# Patient Record
Sex: FEMALE | Race: WHITE | NOT HISPANIC OR LATINO | URBAN - METROPOLITAN AREA
[De-identification: names, ages, dates, MRNs, and addresses within clinical notes are randomized per-mention and may not be internally consistent; named-entity substitution may affect disease eponyms.]

---

## 2017-02-16 ENCOUNTER — INPATIENT (INPATIENT)
Facility: HOSPITAL | Age: 77
LOS: 3 days | Discharge: HOME HEALTH | End: 2017-02-20
Attending: INTERNAL MEDICINE | Admitting: INTERNAL MEDICINE
Payer: MEDICARE

## 2017-02-16 VITALS
SYSTOLIC BLOOD PRESSURE: 152 MMHG | TEMPERATURE: 99 F | OXYGEN SATURATION: 99 % | RESPIRATION RATE: 20 BRPM | DIASTOLIC BLOOD PRESSURE: 108 MMHG | HEART RATE: 84 BPM

## 2017-02-16 LAB
ALBUMIN SERPL ELPH-MCNC: 4.4 G/DL — SIGNIFICANT CHANGE UP (ref 3.3–5)
ALP SERPL-CCNC: 97 U/L — SIGNIFICANT CHANGE UP (ref 40–120)
ALT FLD-CCNC: 23 U/L — SIGNIFICANT CHANGE UP (ref 4–33)
APPEARANCE UR: CLEAR — SIGNIFICANT CHANGE UP
AST SERPL-CCNC: 23 U/L — SIGNIFICANT CHANGE UP (ref 4–32)
BACTERIA # UR AUTO: SIGNIFICANT CHANGE UP
BASOPHILS # BLD AUTO: 0.02 K/UL — SIGNIFICANT CHANGE UP (ref 0–0.2)
BASOPHILS NFR BLD AUTO: 0.2 % — SIGNIFICANT CHANGE UP (ref 0–2)
BILIRUB SERPL-MCNC: 0.9 MG/DL — SIGNIFICANT CHANGE UP (ref 0.2–1.2)
BILIRUB UR-MCNC: NEGATIVE — SIGNIFICANT CHANGE UP
BLOOD UR QL VISUAL: NEGATIVE — SIGNIFICANT CHANGE UP
BUN SERPL-MCNC: 13 MG/DL — SIGNIFICANT CHANGE UP (ref 7–23)
CALCIUM SERPL-MCNC: 10 MG/DL — SIGNIFICANT CHANGE UP (ref 8.4–10.5)
CHLORIDE SERPL-SCNC: 101 MMOL/L — SIGNIFICANT CHANGE UP (ref 98–107)
CO2 SERPL-SCNC: 24 MMOL/L — SIGNIFICANT CHANGE UP (ref 22–31)
COLOR SPEC: SIGNIFICANT CHANGE UP
CREAT SERPL-MCNC: 0.91 MG/DL — SIGNIFICANT CHANGE UP (ref 0.5–1.3)
EOSINOPHIL # BLD AUTO: 0.1 K/UL — SIGNIFICANT CHANGE UP (ref 0–0.5)
EOSINOPHIL NFR BLD AUTO: 1.2 % — SIGNIFICANT CHANGE UP (ref 0–6)
GLUCOSE SERPL-MCNC: 108 MG/DL — HIGH (ref 70–99)
GLUCOSE UR-MCNC: NEGATIVE — SIGNIFICANT CHANGE UP
HCT VFR BLD CALC: 43.7 % — SIGNIFICANT CHANGE UP (ref 34.5–45)
HGB BLD-MCNC: 14.6 G/DL — SIGNIFICANT CHANGE UP (ref 11.5–15.5)
HYALINE CASTS # UR AUTO: SIGNIFICANT CHANGE UP (ref 0–?)
IMM GRANULOCYTES NFR BLD AUTO: 0.2 % — SIGNIFICANT CHANGE UP (ref 0–1.5)
KETONES UR-MCNC: SIGNIFICANT CHANGE UP
LEUKOCYTE ESTERASE UR-ACNC: HIGH
LYMPHOCYTES # BLD AUTO: 2.47 K/UL — SIGNIFICANT CHANGE UP (ref 1–3.3)
LYMPHOCYTES # BLD AUTO: 30.8 % — SIGNIFICANT CHANGE UP (ref 13–44)
MCHC RBC-ENTMCNC: 31.1 PG — SIGNIFICANT CHANGE UP (ref 27–34)
MCHC RBC-ENTMCNC: 33.4 % — SIGNIFICANT CHANGE UP (ref 32–36)
MCV RBC AUTO: 93 FL — SIGNIFICANT CHANGE UP (ref 80–100)
MONOCYTES # BLD AUTO: 0.65 K/UL — SIGNIFICANT CHANGE UP (ref 0–0.9)
MONOCYTES NFR BLD AUTO: 8.1 % — SIGNIFICANT CHANGE UP (ref 2–14)
MUCOUS THREADS # UR AUTO: SIGNIFICANT CHANGE UP
NEUTROPHILS # BLD AUTO: 4.76 K/UL — SIGNIFICANT CHANGE UP (ref 1.8–7.4)
NEUTROPHILS NFR BLD AUTO: 59.5 % — SIGNIFICANT CHANGE UP (ref 43–77)
NITRITE UR-MCNC: NEGATIVE — SIGNIFICANT CHANGE UP
NON-SQ EPI CELLS # UR AUTO: <1 — SIGNIFICANT CHANGE UP
PH UR: 6 — SIGNIFICANT CHANGE UP (ref 4.6–8)
PLATELET # BLD AUTO: 238 K/UL — SIGNIFICANT CHANGE UP (ref 150–400)
PMV BLD: 11.1 FL — SIGNIFICANT CHANGE UP (ref 7–13)
POTASSIUM SERPL-MCNC: 4.8 MMOL/L — SIGNIFICANT CHANGE UP (ref 3.5–5.3)
POTASSIUM SERPL-SCNC: 4.8 MMOL/L — SIGNIFICANT CHANGE UP (ref 3.5–5.3)
PROT SERPL-MCNC: 7.5 G/DL — SIGNIFICANT CHANGE UP (ref 6–8.3)
PROT UR-MCNC: NEGATIVE — SIGNIFICANT CHANGE UP
RBC # BLD: 4.7 M/UL — SIGNIFICANT CHANGE UP (ref 3.8–5.2)
RBC # FLD: 13.1 % — SIGNIFICANT CHANGE UP (ref 10.3–14.5)
RBC CASTS # UR COMP ASSIST: SIGNIFICANT CHANGE UP (ref 0–?)
SODIUM SERPL-SCNC: 142 MMOL/L — SIGNIFICANT CHANGE UP (ref 135–145)
SP GR SPEC: 1.02 — SIGNIFICANT CHANGE UP (ref 1–1.03)
SQUAMOUS # UR AUTO: SIGNIFICANT CHANGE UP
UROBILINOGEN FLD QL: NORMAL E.U. — SIGNIFICANT CHANGE UP (ref 0.1–0.2)
WBC # BLD: 8.02 K/UL — SIGNIFICANT CHANGE UP (ref 3.8–10.5)
WBC # FLD AUTO: 8.02 K/UL — SIGNIFICANT CHANGE UP (ref 3.8–10.5)
WBC UR QL: SIGNIFICANT CHANGE UP (ref 0–?)

## 2017-02-16 PROCEDURE — 71020: CPT | Mod: 26

## 2017-02-16 NOTE — ED PROVIDER NOTE - ATTENDING CONTRIBUTION TO CARE
Andrew: 76 yof with no previous similar episodes, today noted by  to be confused when he came home at 4pm, last seen normal last night or 7am today.  Pt keeps asking same questions and is very nervous and confused.  On exam, no distress, oriented to person and place, does not know president, date, holidays or what she ate or did today, otherwise neuro intact.  UA + with no sxs, states she wiped prior to sample.  Check labs, CT head and neuro eval, unsure if TGA or CVA.

## 2017-02-16 NOTE — ED PROVIDER NOTE - OBJECTIVE STATEMENT
76F h/o HTN, HLD presenting with acute memory loss. Last seen normal at 7am, when  arrived home at 4pm, noted that pt could not recall events from today or yesterday. At baseline, pt has no memory difficulties. Denies F, C, cough, CP/SOb, abd pain, weakness, numbness, headache, visual change.

## 2017-02-16 NOTE — ED ADULT TRIAGE NOTE - CHIEF COMPLAINT QUOTE
pt accompanied by family who state "pt has had change in mental status today she is having difficulty remembering things that were very recent"  FS 95

## 2017-02-17 DIAGNOSIS — I10 ESSENTIAL (PRIMARY) HYPERTENSION: ICD-10-CM

## 2017-02-17 DIAGNOSIS — I63.9 CEREBRAL INFARCTION, UNSPECIFIED: ICD-10-CM

## 2017-02-17 DIAGNOSIS — E78.5 HYPERLIPIDEMIA, UNSPECIFIED: ICD-10-CM

## 2017-02-17 DIAGNOSIS — Z29.9 ENCOUNTER FOR PROPHYLACTIC MEASURES, UNSPECIFIED: ICD-10-CM

## 2017-02-17 LAB
BUN SERPL-MCNC: 13 MG/DL — SIGNIFICANT CHANGE UP (ref 7–23)
CALCIUM SERPL-MCNC: 9.8 MG/DL — SIGNIFICANT CHANGE UP (ref 8.4–10.5)
CHLORIDE SERPL-SCNC: 102 MMOL/L — SIGNIFICANT CHANGE UP (ref 98–107)
CHOLEST SERPL-MCNC: 195 MG/DL — SIGNIFICANT CHANGE UP (ref 120–199)
CO2 SERPL-SCNC: 23 MMOL/L — SIGNIFICANT CHANGE UP (ref 22–31)
CREAT SERPL-MCNC: 0.76 MG/DL — SIGNIFICANT CHANGE UP (ref 0.5–1.3)
FOLATE SERPL-MCNC: > 20 NG/ML — HIGH (ref 4.7–20)
GLUCOSE SERPL-MCNC: 90 MG/DL — SIGNIFICANT CHANGE UP (ref 70–99)
HAV IGM SER-ACNC: NONREACTIVE — SIGNIFICANT CHANGE UP
HBA1C BLD-MCNC: 5.5 % — SIGNIFICANT CHANGE UP (ref 4–5.6)
HBV CORE IGM SER-ACNC: NONREACTIVE — SIGNIFICANT CHANGE UP
HBV SURFACE AG SER-ACNC: NONREACTIVE — SIGNIFICANT CHANGE UP
HCT VFR BLD CALC: 44 % — SIGNIFICANT CHANGE UP (ref 34.5–45)
HCV AB S/CO SERPL IA: 0.17 S/CO — SIGNIFICANT CHANGE UP
HCV AB SERPL-IMP: SIGNIFICANT CHANGE UP
HDLC SERPL-MCNC: 67 MG/DL — HIGH (ref 45–65)
HGB BLD-MCNC: 14.9 G/DL — SIGNIFICANT CHANGE UP (ref 11.5–15.5)
LIPID PNL WITH DIRECT LDL SERPL: 113 MG/DL — SIGNIFICANT CHANGE UP
MCHC RBC-ENTMCNC: 31.8 PG — SIGNIFICANT CHANGE UP (ref 27–34)
MCHC RBC-ENTMCNC: 33.9 % — SIGNIFICANT CHANGE UP (ref 32–36)
MCV RBC AUTO: 93.8 FL — SIGNIFICANT CHANGE UP (ref 80–100)
PLATELET # BLD AUTO: 202 K/UL — SIGNIFICANT CHANGE UP (ref 150–400)
PMV BLD: 11.9 FL — SIGNIFICANT CHANGE UP (ref 7–13)
POTASSIUM SERPL-MCNC: 4.7 MMOL/L — SIGNIFICANT CHANGE UP (ref 3.5–5.3)
POTASSIUM SERPL-SCNC: 4.7 MMOL/L — SIGNIFICANT CHANGE UP (ref 3.5–5.3)
RBC # BLD: 4.69 M/UL — SIGNIFICANT CHANGE UP (ref 3.8–5.2)
RBC # FLD: 13.2 % — SIGNIFICANT CHANGE UP (ref 10.3–14.5)
SODIUM SERPL-SCNC: 140 MMOL/L — SIGNIFICANT CHANGE UP (ref 135–145)
TRIGL SERPL-MCNC: 99 MG/DL — SIGNIFICANT CHANGE UP (ref 10–149)
TSH SERPL-MCNC: 3.89 UIU/ML — SIGNIFICANT CHANGE UP (ref 0.27–4.2)
VIT B12 SERPL-MCNC: 537 PG/ML — SIGNIFICANT CHANGE UP (ref 200–900)
WBC # BLD: 5.97 K/UL — SIGNIFICANT CHANGE UP (ref 3.8–10.5)
WBC # FLD AUTO: 5.97 K/UL — SIGNIFICANT CHANGE UP (ref 3.8–10.5)

## 2017-02-17 PROCEDURE — 99223 1ST HOSP IP/OBS HIGH 75: CPT

## 2017-02-17 PROCEDURE — 99222 1ST HOSP IP/OBS MODERATE 55: CPT | Mod: GC

## 2017-02-17 PROCEDURE — 70450 CT HEAD/BRAIN W/O DYE: CPT | Mod: 26

## 2017-02-17 RX ORDER — LATANOPROST 0.05 MG/ML
1 SOLUTION/ DROPS OPHTHALMIC; TOPICAL AT BEDTIME
Qty: 0 | Refills: 0 | Status: DISCONTINUED | OUTPATIENT
Start: 2017-02-17 | End: 2017-02-20

## 2017-02-17 RX ORDER — ACETAMINOPHEN 500 MG
650 TABLET ORAL EVERY 6 HOURS
Qty: 0 | Refills: 0 | Status: COMPLETED | OUTPATIENT
Start: 2017-02-17 | End: 2017-02-17

## 2017-02-17 RX ORDER — BIMATOPROST 0.3 MG/ML
1 SOLUTION/ DROPS OPHTHALMIC
Qty: 0 | Refills: 0 | COMMUNITY

## 2017-02-17 RX ORDER — ATORVASTATIN CALCIUM 80 MG/1
40 TABLET, FILM COATED ORAL AT BEDTIME
Qty: 0 | Refills: 0 | Status: DISCONTINUED | OUTPATIENT
Start: 2017-02-17 | End: 2017-02-20

## 2017-02-17 RX ORDER — CEFTRIAXONE 500 MG/1
1 INJECTION, POWDER, FOR SOLUTION INTRAMUSCULAR; INTRAVENOUS ONCE
Qty: 0 | Refills: 0 | Status: COMPLETED | OUTPATIENT
Start: 2017-02-17 | End: 2017-02-17

## 2017-02-17 RX ORDER — CEPHALEXIN 500 MG
500 CAPSULE ORAL ONCE
Qty: 0 | Refills: 0 | Status: COMPLETED | OUTPATIENT
Start: 2017-02-17 | End: 2017-02-17

## 2017-02-17 RX ORDER — CEFTRIAXONE 500 MG/1
INJECTION, POWDER, FOR SOLUTION INTRAMUSCULAR; INTRAVENOUS
Qty: 0 | Refills: 0 | Status: DISCONTINUED | OUTPATIENT
Start: 2017-02-17 | End: 2017-02-18

## 2017-02-17 RX ORDER — LANOLIN ALCOHOL/MO/W.PET/CERES
3 CREAM (GRAM) TOPICAL AT BEDTIME
Qty: 0 | Refills: 0 | Status: DISCONTINUED | OUTPATIENT
Start: 2017-02-17 | End: 2017-02-20

## 2017-02-17 RX ORDER — LOSARTAN POTASSIUM 100 MG/1
25 TABLET, FILM COATED ORAL DAILY
Qty: 0 | Refills: 0 | Status: DISCONTINUED | OUTPATIENT
Start: 2017-02-17 | End: 2017-02-20

## 2017-02-17 RX ORDER — ASPIRIN/CALCIUM CARB/MAGNESIUM 324 MG
81 TABLET ORAL DAILY
Qty: 0 | Refills: 0 | Status: DISCONTINUED | OUTPATIENT
Start: 2017-02-17 | End: 2017-02-20

## 2017-02-17 RX ORDER — CEFTRIAXONE 500 MG/1
1 INJECTION, POWDER, FOR SOLUTION INTRAMUSCULAR; INTRAVENOUS EVERY 24 HOURS
Qty: 0 | Refills: 0 | Status: DISCONTINUED | OUTPATIENT
Start: 2017-02-18 | End: 2017-02-18

## 2017-02-17 RX ORDER — METOPROLOL TARTRATE 50 MG
25 TABLET ORAL DAILY
Qty: 0 | Refills: 0 | Status: DISCONTINUED | OUTPATIENT
Start: 2017-02-17 | End: 2017-02-20

## 2017-02-17 RX ORDER — DORZOLAMIDE HYDROCHLORIDE TIMOLOL MALEATE 20; 5 MG/ML; MG/ML
1 SOLUTION/ DROPS OPHTHALMIC
Qty: 0 | Refills: 0 | Status: DISCONTINUED | OUTPATIENT
Start: 2017-02-17 | End: 2017-02-20

## 2017-02-17 RX ORDER — ONDANSETRON 8 MG/1
4 TABLET, FILM COATED ORAL EVERY 4 HOURS
Qty: 0 | Refills: 0 | Status: DISCONTINUED | OUTPATIENT
Start: 2017-02-17 | End: 2017-02-20

## 2017-02-17 RX ORDER — ENOXAPARIN SODIUM 100 MG/ML
40 INJECTION SUBCUTANEOUS EVERY 24 HOURS
Qty: 0 | Refills: 0 | Status: DISCONTINUED | OUTPATIENT
Start: 2017-02-17 | End: 2017-02-20

## 2017-02-17 RX ORDER — IBUPROFEN 200 MG
600 TABLET ORAL ONCE
Qty: 0 | Refills: 0 | Status: COMPLETED | OUTPATIENT
Start: 2017-02-17 | End: 2017-02-17

## 2017-02-17 RX ORDER — PILOCARPINE HCL 4 %
1 DROPS OPHTHALMIC (EYE) THREE TIMES A DAY
Qty: 0 | Refills: 0 | Status: DISCONTINUED | OUTPATIENT
Start: 2017-02-17 | End: 2017-02-20

## 2017-02-17 RX ORDER — PILOCARPINE HCL 4 %
2 DROPS OPHTHALMIC (EYE)
Qty: 0 | Refills: 0 | COMMUNITY

## 2017-02-17 RX ADMIN — Medication 25 MILLIGRAM(S): at 05:29

## 2017-02-17 RX ADMIN — Medication 3 MILLIGRAM(S): at 23:07

## 2017-02-17 RX ADMIN — Medication 1 DROP(S): at 07:09

## 2017-02-17 RX ADMIN — ATORVASTATIN CALCIUM 40 MILLIGRAM(S): 80 TABLET, FILM COATED ORAL at 22:56

## 2017-02-17 RX ADMIN — DORZOLAMIDE HYDROCHLORIDE TIMOLOL MALEATE 1 DROP(S): 20; 5 SOLUTION/ DROPS OPHTHALMIC at 07:08

## 2017-02-17 RX ADMIN — LATANOPROST 1 DROP(S): 0.05 SOLUTION/ DROPS OPHTHALMIC; TOPICAL at 22:56

## 2017-02-17 RX ADMIN — CEFTRIAXONE 100 GRAM(S): 500 INJECTION, POWDER, FOR SOLUTION INTRAMUSCULAR; INTRAVENOUS at 14:04

## 2017-02-17 RX ADMIN — Medication 650 MILLIGRAM(S): at 19:44

## 2017-02-17 RX ADMIN — Medication 650 MILLIGRAM(S): at 20:15

## 2017-02-17 RX ADMIN — Medication 500 MILLIGRAM(S): at 00:40

## 2017-02-17 RX ADMIN — ENOXAPARIN SODIUM 40 MILLIGRAM(S): 100 INJECTION SUBCUTANEOUS at 05:31

## 2017-02-17 RX ADMIN — Medication 81 MILLIGRAM(S): at 19:18

## 2017-02-17 RX ADMIN — Medication 600 MILLIGRAM(S): at 23:30

## 2017-02-17 RX ADMIN — LOSARTAN POTASSIUM 25 MILLIGRAM(S): 100 TABLET, FILM COATED ORAL at 05:29

## 2017-02-17 RX ADMIN — Medication 600 MILLIGRAM(S): at 23:07

## 2017-02-17 RX ADMIN — Medication 1 DROP(S): at 14:59

## 2017-02-17 RX ADMIN — DORZOLAMIDE HYDROCHLORIDE TIMOLOL MALEATE 1 DROP(S): 20; 5 SOLUTION/ DROPS OPHTHALMIC at 19:42

## 2017-02-17 RX ADMIN — ONDANSETRON 4 MILLIGRAM(S): 8 TABLET, FILM COATED ORAL at 13:48

## 2017-02-17 NOTE — H&P ADULT. - NEGATIVE GASTROINTESTINAL SYMPTOMS
no constipation/no diarrhea/no nausea/no vomiting no nausea/no constipation/no diarrhea/no abdominal pain/no vomiting

## 2017-02-17 NOTE — H&P ADULT. - HISTORY OF PRESENT ILLNESS
H[place]ian speaking and the history is provide by the daughter at the pt's request.   Self ambulating 76F with a history of Glaucoma, HTN, had a acute onset of memory loss. As per the daughter, On 2/16, the pt was last seen acting ba by the  @ 7 hillary  is able to recall names and object. But is unable to recall recent events and time. Citizen of [place] speaking and the history is provide by the daughter at the pt's request.   Self ambulating 76F with a history of Glaucoma, HTN, had a acute onset of memory loss.  On 2/16, the pt was last seen acting her normal self by the  @ 7 am. He returned frome   is able to recall names and object. But is unable to recall recent events and time. Polish speaking and the history is provide by the daughter at the pt's request.   Self ambulating 76F with a history of Glaucoma, HTN, had a acute onset of memory loss.  On 2/16, the pt was last seen acting her normal self by the  @ 7 am. He returned home at 4 pm and the pt was acting confused, unable to recall recent events, time and kept re asking questions. She was able to recall names and object. There was no recent falls, head/ body trauma. No HA, dizziness, SOB, chest pain palpitations, abdominal pain, nausea, vomit, diarrhea, dysuria, chills, diaphoresis. She was taken to the Ed by her daughter and .    In the Ed, she was seen by the neuro resident. Their consult and recommendations are in the chart. UA = Large leuk esterase and was given Keflex 500 mg po x 1. The pt is A febrile and no WBC.   As per the family, the pt memory has not gotten worse an may have improved.

## 2017-02-17 NOTE — PHYSICAL THERAPY INITIAL EVALUATION ADULT - REHAB POTENTIAL, PT EVAL
Please make sure I am booked out from 1-2 pm on 2/2/17, and also please hold my 11:30 AM slot that morning.
pt currently ambulating safely and independently at baseline

## 2017-02-17 NOTE — H&P ADULT. - NEGATIVE OPHTHALMOLOGIC SYMPTOMS
no blurred vision L/no blurred vision R/no photophobia/no lacrimation L/no discharge R/no discharge L/no lacrimation R no photophobia/no blurred vision R/no diplopia/no lacrimation L/no blurred vision L/no discharge L/no lacrimation R/no discharge R

## 2017-02-17 NOTE — SWALLOW BEDSIDE ASSESSMENT ADULT - COMMENTS
The pt. was received in bed awake and positioned upright and greeted this therapist upon entering the room. The pt. expressed that she is "feeling fine". The pt. independently consumed trials of puree, regular solids and thin liquids demonstrating adequate oral containment and mastication with timely A-P transit of the bolus. Adequate pharyngeal phase with all consistencies presented demonstrating timely swallows with adequate laryngeal elevation upon palpation. No overt clinical signs of laryngeal penetration or aspiration observed. Recommend to advance diet to regular solids with thin liquids. Reviewed results and recommendations with the pt. and her nurse Maru.

## 2017-02-17 NOTE — H&P ADULT. - NEGATIVE ENMT SYMPTOMS
no vertigo/no gum bleeding/no abnormal taste sensation/no nasal congestion/no tinnitus no nasal congestion/no gum bleeding/no vertigo/no nose bleeds/no abnormal taste sensation/no tinnitus

## 2017-02-17 NOTE — OCCUPATIONAL THERAPY INITIAL EVALUATION ADULT - PERTINENT HX OF CURRENT PROBLEM, REHAB EVAL
76F with a history of Glaucoma, HTN, had a acute onset of memory loss.  On 2/16, the pt was last seen acting her normal self by the  @ 7 am. He returned home at 4 pm and the pt was acting confused, unable to recall recent events, time and kept re asking questions. She was able to recall names and object. There was no recent falls, head/ body trauma.

## 2017-02-17 NOTE — DISCHARGE NOTE ADULT - CARE PLAN
Principal Discharge DX:	Transient global amnesia  Secondary Diagnosis:	Essential hypertension  Instructions for follow-up, activity and diet:	Continue medications as currently prescribed. Follow up with your PMD for further management of disease. Dash diet.  Secondary Diagnosis:	Hyperlipidemia, unspecified hyperlipidemia type  Instructions for follow-up, activity and diet:	Continue current medications and low fat diet. Principal Discharge DX:	Transient global amnesia  Goal:	most likely due mild UTI  Instructions for follow-up, activity and diet:	Continue regular diet  activities as tolerated  repeat MRI in 3 months  Secondary Diagnosis:	Essential hypertension  Instructions for follow-up, activity and diet:	Continue medications as currently prescribed. Follow up with your PMD for further management of disease. Dash diet.  Secondary Diagnosis:	Hyperlipidemia, unspecified hyperlipidemia type  Instructions for follow-up, activity and diet:	Continue current medications and low fat diet.

## 2017-02-17 NOTE — SWALLOW BEDSIDE ASSESSMENT ADULT - SWALLOW EVAL: RECOMMENDED FEEDING/EATING TECHNIQUES
allow for swallow between intakes/position upright (90 degrees)/oral hygiene/maintain upright posture during/after eating for 30 mins

## 2017-02-17 NOTE — H&P ADULT. - NEGATIVE MUSCULOSKELETAL SYMPTOMS
no myalgia/no arm pain L/no neck pain/no muscle cramps/no stiffness/no arm pain R/no leg pain L/no leg pain R/no muscle weakness

## 2017-02-17 NOTE — H&P ADULT. - NEUROLOGICAL DETAILS
alert and oriented x 3/sensation intact/responds to verbal commands responds to pain/alert and oriented x 3/sensation intact/responds to verbal commands

## 2017-02-17 NOTE — H&P ADULT. - NEGATIVE NEUROLOGICAL SYMPTOMS
no loss of consciousness/no hemiparesis/no transient paralysis/no generalized seizures/no difficulty walking/no vertigo/no facial palsy/no focal seizures/no weakness/no syncope/no paresthesias/no tremors/no loss of sensation

## 2017-02-17 NOTE — ED ADULT NURSE NOTE - OBJECTIVE STATEMENT
76F received in spot 18.  family co acute memory loss. Last seen normal at 7am, when  arrived home at 4pm.   At baseline, pt has no memory difficulties. Denies F, C, cough, CP/SOb, abd pain, weakness, numbness, headache, visual change.  20 R ac.  family at bedside.  labs sent  Pt is resting comfortable in bed.  Vital signs as noted.  Will continue to monitor patient closely.  Lenka ROMO.

## 2017-02-17 NOTE — DISCHARGE NOTE ADULT - NS AS DC STROKE ED MATERIALS
Call 911 for Stroke/Prescribed Medications/Risk Factors for Stroke/Need for Followup After Discharge/Stroke Education Booklet/Stroke Warning Signs and Symptoms

## 2017-02-17 NOTE — H&P ADULT. - ATTENDING COMMENTS
77 y/o female HX of HTN, Glaucoma, Obesity, admitted for confusion x 2 days,  Sudden Short term memory loss, Symptoms improved in the ER,   A+O x3, NO CP, NO SOB, NO HA, No N/V, No Fever, no dysuria, no abdominal pain , no cough, no focal weakness, No rash, R/O CVA, R/O TIA    Tele monitor, Echo, Neuro F/U, MRI/MRA head and Neck, ASA, DVT Prophylaxis: SQ Lovenox, Lipitor, Fall/aspiration precaution, Vit B12, Hep A, B, C Profile  , fasting Lipid , eye Drops, PT/OT Consults, Toprol XL, Cozaar, Urine Culture, Speech And swallow consult    Case D/W Pt, Tele PA    pt was seen & Examined by me, Dr. YESSENIA Hatch on 2/17/17.

## 2017-02-17 NOTE — DISCHARGE NOTE ADULT - HOSPITAL COURSE
Self ambulating 76F with a history of Glaucoma, HTN, had a acute onset of memory loss.  On 2/16, the pt was last seen acting her normal self by the  @ 7 am. He returned home at 4 pm and the pt was acting confused, unable to recall recent events, time and kept re asking questions. She was able to recall names and object. There was no recent falls, head/ body trauma. No HA, dizziness, SOB, chest pain palpitations, abdominal pain, nausea, vomit, diarrhea, dysuria, chills, diaphoresis. She was taken to the Ed by her daughter and .    On admission:  EKG NSR @ 75  Serial cardiac enzyme negative  UA Large Leuks - received Ceftriaxone but was discontinued with urine culture did not grow any bacteria.   CXR Clear  CT Head without evidence of acute CVA or hemorrhage  Speech and Swallow - regular diet with thin liquids.   Neurology evaluated recommended MRI/MRA of head and neck and an echocardiogram for eval of symptoms. Symptoms may be transient global amnesia. Self ambulating 76F with a history of Glaucoma, HTN, had a acute onset of memory loss.  On 2/16, the pt was last seen acting her normal self by the  @ 7 am. He returned home at 4 pm and the pt was acting confused, unable to recall recent events, time and kept re asking questions. She was able to recall names and object. There was no recent falls, head/ body trauma. No HA, dizziness, SOB, chest pain palpitations, abdominal pain, nausea, vomit, diarrhea, dysuria, chills, diaphoresis. She was taken to the Ed by her daughter and .    On admission:  EKG NSR @ 75  Serial cardiac enzyme negative  UA Large Leuks - received Ceftriaxone but was discontinued with urine culture did not grow any bacteria.   CXR Clear  CT Head without evidence of acute CVA or hemorrhage  Speech and Swallow - regular diet with thin liquids.   Neurology evaluated recommended MRI/MRA of head and neck and an echocardiogram for eval of symptoms. Symptoms may be transient global amnesia.  2/19 MRI/MRA Head and neck: No evidence of acute cerebral ischemia. Enhancing lesion along the planum sphenoidale likely representing a  meningioma. Follow-up MR of the pituitary gland in 3 months is advised  for further evaluation.  No evidence of intracranial or extracranial significant vascular stenosis or occlusion. Self ambulating 76F with a history of Glaucoma, HTN, had a acute onset of memory loss.  On 2/16, the pt was last seen acting her normal self by the  @ 7 am. He returned home at 4 pm and the pt was acting confused, unable to recall recent events, time and kept re asking questions. She was able to recall names and object. There was no recent falls, head/ body trauma. No HA, dizziness, SOB, chest pain palpitations, abdominal pain, nausea, vomit, diarrhea, dysuria, chills, diaphoresis. She was taken to the Ed by her daughter and .    On admission:  EKG NSR @ 75  Serial cardiac enzyme negative  UA Large Leuks - received Ceftriaxone but was discontinued with urine culture did not grow any bacteria.   CXR Clear  CT Head without evidence of acute CVA or hemorrhage  Speech and Swallow - regular diet with thin liquids.   Neurology evaluated recommended MRI/MRA of head and neck and an echocardiogram for eval of symptoms. Symptoms may be transient global amnesia.  2/19 MRI/MRA Head and neck: No evidence of acute cerebral ischemia. Enhancing lesion along the planum sphenoidale likely representing a  meningioma. Follow-up MR of the pituitary gland in 3 months is advised  for further evaluation.  No evidence of intracranial or extracranial significant vascular stenosis or occlusion.   stable for d/c home. Self ambulating 76F with a history of Glaucoma, HTN, had a acute onset of memory loss.  On 2/16, the pt was last seen acting her normal self by the  @ 7 am. He returned home at 4 pm and the pt was acting confused, unable to recall recent events, time and kept re asking questions. She was able to recall names and object. There was no recent falls, head/ body trauma. No HA, dizziness, SOB, chest pain palpitations, abdominal pain, nausea, vomit, diarrhea, dysuria, chills, diaphoresis. She was taken to the Ed by her daughter and .    On admission:  EKG NSR @ 75  Serial cardiac enzyme negative  UA Large Leuks - received Ceftriaxone but was discontinued with urine culture did not grow any bacteria.   CXR Clear  CT Head without evidence of acute CVA or hemorrhage  Speech and Swallow - regular diet with thin liquids.   Neurology evaluated recommended MRI/MRA of head and neck and TTE for eval of symptoms. Symptoms may be transient global amnesia.  2/19 MRI/MRA Head and neck: No evidence of acute cerebral ischemia. Enhancing lesion along the planum sphenoidale likely representing a  meningioma. Follow-up MR of the pituitary gland in 3 months is advised  for further evaluation.  No evidence of intracranial or extracranial significant vascular stenosis or occlusion.  Stable for d/c home, has home care 2x per week for 4 hours.  Given MRI report to provided to PCP (vs establish OP neuro f/u).

## 2017-02-17 NOTE — DISCHARGE NOTE ADULT - MEDICATION SUMMARY - MEDICATIONS TO TAKE
I will START or STAY ON the medications listed below when I get home from the hospital:    aspirin 81 mg oral delayed release tablet  -- 1 tab(s) by mouth once a day  -- Indication: For Hyperlipidemia, unspecified hyperlipidemia type    losartan 25 mg oral tablet  -- 1 tab(s) by mouth once a day  -- Indication: For HTN (hypertension)    atorvastatin 40 mg oral tablet  -- 1 tab(s) by mouth once a day (at bedtime)  -- Indication: For HLD (hyperlipidemia)    metoprolol succinate 25 mg oral tablet, extended release  -- 1 tab(s) by mouth once a day  -- Indication: For HTN (hypertension)    dorzolamide-timolol 2%-0.5% preservative-free ophthalmic solution  -- 1 drop(s) to each affected eye 2 times a day  -- Indication: For glycoma    Lumigan 0.01% ophthalmic solution  -- 1 drop(s) to each affected eye once a day (in the evening)  -- Indication: For glycoma    pilocarpine 2% ophthalmic solution  -- 2 drop(s) to each affected eye 3 times a day  -- Indication: For glycoma

## 2017-02-17 NOTE — H&P ADULT. - PROBLEM SELECTOR PLAN 1
CM  F/U CBC, BMP, TSH, FLP, A1C  F/U TTE, MRI B, MRA H & N   F/U PT, OT, PM & R  Give O2, ASA, Statin  No need for permissive HTN  Fall, aspirations and seizure precautions

## 2017-02-17 NOTE — DISCHARGE NOTE ADULT - PATIENT PORTAL LINK FT
“You can access the [facility] Patient Portal, offered by [facility], by registering with the following website: [email or web address]”

## 2017-02-17 NOTE — DISCHARGE NOTE ADULT - PLAN OF CARE
Continue medications as currently prescribed. Follow up with your PMD for further management of disease. Dash diet. Continue current medications and low fat diet. most likely due mild UTI Continue regular diet  activities as tolerated  repeat MRI in 3 months

## 2017-02-18 LAB
BACTERIA UR CULT: SIGNIFICANT CHANGE UP
BUN SERPL-MCNC: 17 MG/DL — SIGNIFICANT CHANGE UP (ref 7–23)
CALCIUM SERPL-MCNC: 9.2 MG/DL — SIGNIFICANT CHANGE UP (ref 8.4–10.5)
CHLORIDE SERPL-SCNC: 99 MMOL/L — SIGNIFICANT CHANGE UP (ref 98–107)
CO2 SERPL-SCNC: 22 MMOL/L — SIGNIFICANT CHANGE UP (ref 22–31)
CREAT SERPL-MCNC: 0.88 MG/DL — SIGNIFICANT CHANGE UP (ref 0.5–1.3)
GLUCOSE SERPL-MCNC: 103 MG/DL — HIGH (ref 70–99)
HCT VFR BLD CALC: 39.4 % — SIGNIFICANT CHANGE UP (ref 34.5–45)
HGB BLD-MCNC: 13.5 G/DL — SIGNIFICANT CHANGE UP (ref 11.5–15.5)
MCHC RBC-ENTMCNC: 31.8 PG — SIGNIFICANT CHANGE UP (ref 27–34)
MCHC RBC-ENTMCNC: 34.3 % — SIGNIFICANT CHANGE UP (ref 32–36)
MCV RBC AUTO: 92.9 FL — SIGNIFICANT CHANGE UP (ref 80–100)
PLATELET # BLD AUTO: 215 K/UL — SIGNIFICANT CHANGE UP (ref 150–400)
PMV BLD: 11.6 FL — SIGNIFICANT CHANGE UP (ref 7–13)
POTASSIUM SERPL-MCNC: 4 MMOL/L — SIGNIFICANT CHANGE UP (ref 3.5–5.3)
POTASSIUM SERPL-SCNC: 4 MMOL/L — SIGNIFICANT CHANGE UP (ref 3.5–5.3)
RBC # BLD: 4.24 M/UL — SIGNIFICANT CHANGE UP (ref 3.8–5.2)
RBC # FLD: 13.3 % — SIGNIFICANT CHANGE UP (ref 10.3–14.5)
SODIUM SERPL-SCNC: 140 MMOL/L — SIGNIFICANT CHANGE UP (ref 135–145)
SPECIMEN SOURCE: SIGNIFICANT CHANGE UP
T PALLIDUM AB TITR SER: NEGATIVE — SIGNIFICANT CHANGE UP
WBC # BLD: 6.9 K/UL — SIGNIFICANT CHANGE UP (ref 3.8–10.5)
WBC # FLD AUTO: 6.9 K/UL — SIGNIFICANT CHANGE UP (ref 3.8–10.5)

## 2017-02-18 PROCEDURE — 99232 SBSQ HOSP IP/OBS MODERATE 35: CPT

## 2017-02-18 RX ORDER — ACETAMINOPHEN 500 MG
650 TABLET ORAL EVERY 6 HOURS
Qty: 0 | Refills: 0 | Status: DISCONTINUED | OUTPATIENT
Start: 2017-02-18 | End: 2017-02-20

## 2017-02-18 RX ADMIN — Medication 1 DROP(S): at 05:38

## 2017-02-18 RX ADMIN — ATORVASTATIN CALCIUM 40 MILLIGRAM(S): 80 TABLET, FILM COATED ORAL at 21:32

## 2017-02-18 RX ADMIN — DORZOLAMIDE HYDROCHLORIDE TIMOLOL MALEATE 1 DROP(S): 20; 5 SOLUTION/ DROPS OPHTHALMIC at 18:12

## 2017-02-18 RX ADMIN — Medication 81 MILLIGRAM(S): at 14:17

## 2017-02-18 RX ADMIN — Medication 650 MILLIGRAM(S): at 06:47

## 2017-02-18 RX ADMIN — LOSARTAN POTASSIUM 25 MILLIGRAM(S): 100 TABLET, FILM COATED ORAL at 05:37

## 2017-02-18 RX ADMIN — DORZOLAMIDE HYDROCHLORIDE TIMOLOL MALEATE 1 DROP(S): 20; 5 SOLUTION/ DROPS OPHTHALMIC at 05:37

## 2017-02-18 RX ADMIN — ENOXAPARIN SODIUM 40 MILLIGRAM(S): 100 INJECTION SUBCUTANEOUS at 05:37

## 2017-02-18 RX ADMIN — Medication 1 DROP(S): at 14:17

## 2017-02-18 RX ADMIN — Medication 3 MILLIGRAM(S): at 22:30

## 2017-02-18 RX ADMIN — Medication 1 DROP(S): at 00:30

## 2017-02-18 RX ADMIN — Medication 650 MILLIGRAM(S): at 06:14

## 2017-02-18 RX ADMIN — Medication 1 DROP(S): at 21:32

## 2017-02-18 RX ADMIN — LATANOPROST 1 DROP(S): 0.05 SOLUTION/ DROPS OPHTHALMIC; TOPICAL at 21:32

## 2017-02-18 RX ADMIN — Medication 25 MILLIGRAM(S): at 05:37

## 2017-02-19 PROCEDURE — 99232 SBSQ HOSP IP/OBS MODERATE 35: CPT

## 2017-02-19 PROCEDURE — 70551 MRI BRAIN STEM W/O DYE: CPT | Mod: 26

## 2017-02-19 PROCEDURE — 70548 MR ANGIOGRAPHY NECK W/DYE: CPT | Mod: 26

## 2017-02-19 RX ADMIN — DORZOLAMIDE HYDROCHLORIDE TIMOLOL MALEATE 1 DROP(S): 20; 5 SOLUTION/ DROPS OPHTHALMIC at 17:44

## 2017-02-19 RX ADMIN — Medication 1 DROP(S): at 05:20

## 2017-02-19 RX ADMIN — Medication 1 DROP(S): at 13:17

## 2017-02-19 RX ADMIN — Medication 25 MILLIGRAM(S): at 05:20

## 2017-02-19 RX ADMIN — DORZOLAMIDE HYDROCHLORIDE TIMOLOL MALEATE 1 DROP(S): 20; 5 SOLUTION/ DROPS OPHTHALMIC at 05:20

## 2017-02-19 RX ADMIN — ATORVASTATIN CALCIUM 40 MILLIGRAM(S): 80 TABLET, FILM COATED ORAL at 22:01

## 2017-02-19 RX ADMIN — Medication 1 DROP(S): at 22:01

## 2017-02-19 RX ADMIN — Medication 81 MILLIGRAM(S): at 12:05

## 2017-02-19 RX ADMIN — LOSARTAN POTASSIUM 25 MILLIGRAM(S): 100 TABLET, FILM COATED ORAL at 05:20

## 2017-02-19 RX ADMIN — ENOXAPARIN SODIUM 40 MILLIGRAM(S): 100 INJECTION SUBCUTANEOUS at 05:20

## 2017-02-19 RX ADMIN — Medication 3 MILLIGRAM(S): at 22:02

## 2017-02-19 RX ADMIN — LATANOPROST 1 DROP(S): 0.05 SOLUTION/ DROPS OPHTHALMIC; TOPICAL at 22:01

## 2017-02-20 VITALS
DIASTOLIC BLOOD PRESSURE: 66 MMHG | OXYGEN SATURATION: 95 % | TEMPERATURE: 98 F | SYSTOLIC BLOOD PRESSURE: 135 MMHG | RESPIRATION RATE: 18 BRPM | HEART RATE: 77 BPM

## 2017-02-20 PROCEDURE — 99239 HOSP IP/OBS DSCHRG MGMT >30: CPT

## 2017-02-20 RX ORDER — ASPIRIN/CALCIUM CARB/MAGNESIUM 324 MG
1 TABLET ORAL
Qty: 0 | Refills: 0 | COMMUNITY
Start: 2017-02-20

## 2017-02-20 RX ORDER — LOSARTAN POTASSIUM 100 MG/1
1 TABLET, FILM COATED ORAL
Qty: 0 | Refills: 0 | COMMUNITY

## 2017-02-20 RX ORDER — ATORVASTATIN CALCIUM 80 MG/1
1 TABLET, FILM COATED ORAL
Qty: 30 | Refills: 0 | OUTPATIENT
Start: 2017-02-20 | End: 2017-03-22

## 2017-02-20 RX ORDER — LOSARTAN POTASSIUM 100 MG/1
1 TABLET, FILM COATED ORAL
Qty: 0 | Refills: 0 | COMMUNITY
Start: 2017-02-20

## 2017-02-20 RX ORDER — DORZOLAMIDE HYDROCHLORIDE TIMOLOL MALEATE 20; 5 MG/ML; MG/ML
1 SOLUTION/ DROPS OPHTHALMIC
Qty: 0 | Refills: 0 | COMMUNITY
Start: 2017-02-20

## 2017-02-20 RX ORDER — METOPROLOL TARTRATE 50 MG
1 TABLET ORAL
Qty: 0 | Refills: 0 | COMMUNITY
Start: 2017-02-20

## 2017-02-20 RX ORDER — METOPROLOL TARTRATE 50 MG
1 TABLET ORAL
Qty: 0 | Refills: 0 | COMMUNITY

## 2017-02-20 RX ORDER — ASPIRIN/CALCIUM CARB/MAGNESIUM 324 MG
1 TABLET ORAL
Qty: 0 | Refills: 0 | COMMUNITY

## 2017-02-20 RX ORDER — DORZOLAMIDE HYDROCHLORIDE TIMOLOL MALEATE 20; 5 MG/ML; MG/ML
1 SOLUTION/ DROPS OPHTHALMIC
Qty: 0 | Refills: 0 | COMMUNITY

## 2017-02-20 RX ADMIN — Medication 1 DROP(S): at 12:52

## 2017-02-20 RX ADMIN — Medication 81 MILLIGRAM(S): at 12:51

## 2017-02-20 RX ADMIN — Medication 1 DROP(S): at 05:29

## 2017-02-20 RX ADMIN — DORZOLAMIDE HYDROCHLORIDE TIMOLOL MALEATE 1 DROP(S): 20; 5 SOLUTION/ DROPS OPHTHALMIC at 05:29

## 2017-02-20 RX ADMIN — LOSARTAN POTASSIUM 25 MILLIGRAM(S): 100 TABLET, FILM COATED ORAL at 05:29

## 2017-02-20 RX ADMIN — ENOXAPARIN SODIUM 40 MILLIGRAM(S): 100 INJECTION SUBCUTANEOUS at 05:29

## 2017-02-20 RX ADMIN — Medication 25 MILLIGRAM(S): at 05:29

## 2022-02-18 NOTE — PHYSICAL THERAPY INITIAL EVALUATION ADULT - DIAGNOSIS, PT EVAL
There is no evidence of optic nerve damage on today's examination. You should follow-up with your normal ophthalmologist for treatment of your dry eyes. You should increase your artificial tears by using artificial free preservative tears.      Refresh PM at night before bed and preservative free artificial tears as needed (can be used an unlimited).     Consider changing Restasis to Xiidra +/- punctal plugs, but this can be at the discretion of your regular ophthalmologist.    Consider alternative to Travatan.    Follow-up with us as needed.   
evaluation of ambulation

## 2022-11-24 NOTE — PATIENT PROFILE ADULT. - TEACHING/LEARNING LEARNING PREFERENCES
Unknown if ever smoked Stable audio/individual instruction/skill demonstration/written material/verbal instruction
